# Patient Record
Sex: FEMALE | URBAN - METROPOLITAN AREA
[De-identification: names, ages, dates, MRNs, and addresses within clinical notes are randomized per-mention and may not be internally consistent; named-entity substitution may affect disease eponyms.]

---

## 2023-11-02 ENCOUNTER — TRANSFERRED RECORDS (OUTPATIENT)
Dept: HEALTH INFORMATION MANAGEMENT | Facility: CLINIC | Age: 73
End: 2023-11-02

## 2024-01-05 ENCOUNTER — MEDICAL CORRESPONDENCE (OUTPATIENT)
Dept: HEALTH INFORMATION MANAGEMENT | Facility: CLINIC | Age: 74
End: 2024-01-05
Payer: COMMERCIAL

## 2024-01-05 ENCOUNTER — TRANSFERRED RECORDS (OUTPATIENT)
Dept: HEALTH INFORMATION MANAGEMENT | Facility: CLINIC | Age: 74
End: 2024-01-05
Payer: COMMERCIAL

## 2024-01-10 ENCOUNTER — TRANSCRIBE ORDERS (OUTPATIENT)
Dept: OTHER | Age: 74
End: 2024-01-10

## 2024-01-10 DIAGNOSIS — M25.562 BILATERAL KNEE PAIN: ICD-10-CM

## 2024-01-10 DIAGNOSIS — R68.89 FORGETFULNESS: Primary | ICD-10-CM

## 2024-01-10 DIAGNOSIS — M25.561 BILATERAL KNEE PAIN: ICD-10-CM

## 2025-04-03 ENCOUNTER — TRANSFERRED RECORDS (OUTPATIENT)
Dept: HEALTH INFORMATION MANAGEMENT | Facility: CLINIC | Age: 75
End: 2025-04-03
Payer: COMMERCIAL

## 2025-04-03 ENCOUNTER — HOSPITAL ENCOUNTER (OUTPATIENT)
Facility: CLINIC | Age: 75
End: 2025-04-03
Attending: STUDENT IN AN ORGANIZED HEALTH CARE EDUCATION/TRAINING PROGRAM | Admitting: STUDENT IN AN ORGANIZED HEALTH CARE EDUCATION/TRAINING PROGRAM
Payer: COMMERCIAL

## 2025-04-15 ENCOUNTER — TRANSFERRED RECORDS (OUTPATIENT)
Dept: HEALTH INFORMATION MANAGEMENT | Facility: CLINIC | Age: 75
End: 2025-04-15
Payer: COMMERCIAL

## 2025-04-17 VITALS — WEIGHT: 182.8 LBS | BODY MASS INDEX: 33.64 KG/M2 | HEIGHT: 62 IN

## 2025-04-17 RX ORDER — LEVOTHYROXINE SODIUM 50 UG/1
50 TABLET ORAL
COMMUNITY
End: 2025-04-23 | Stop reason: HOSPADM

## 2025-04-17 RX ORDER — LOSARTAN POTASSIUM 100 MG/1
100 TABLET ORAL DAILY
COMMUNITY
End: 2025-04-23 | Stop reason: HOSPADM

## 2025-04-17 RX ORDER — ASPIRIN 81 MG/1
81 TABLET ORAL DAILY
COMMUNITY
End: 2025-04-17

## 2025-04-17 RX ORDER — HYDROCHLOROTHIAZIDE 25 MG/1
25 TABLET ORAL DAILY
COMMUNITY
End: 2025-04-23 | Stop reason: HOSPADM

## 2025-04-17 NOTE — PROGRESS NOTES
Planning to discharge home on POD 1 around lunchtime with her  and daughter staying with her to help after surgery. Her sons and daughter-in-laws will also be helping after surgery.       04/17/25 1442   Discharge Planning   Patient/Family Anticipates Transition to home with family  (Patient's daughter will call to arrange outpatient PT at [facility])   Concerns to be Addressed all concerns addressed in this encounter   Living Arrangements   People in Home spouse   Type of Residence Private Residence   Is your private residence a single family home or apartment? Single family home   Number of Stairs, Within Home, Primary none  (1 exterior step)   Once home, are you able to live on one level? Yes   Which level? Main Level   Bathroom Shower/Tub Tub/Shower unit   Equipment Currently Used at Home shower chair  (Has a cane and walker at home)   Support System   Support Systems Spouse/Significant Other;Children  (, Bacilio, daughter, Carmella, and sons, Montez, Charles, and Blu)   Do you have someone available to stay with you one or two nights once you are home? Yes   Education   Patient attended total joint pre-op class/received pre-op teaching  email/phone call

## 2025-04-22 ENCOUNTER — TRANSFERRED RECORDS (OUTPATIENT)
Dept: MULTI SPECIALTY CLINIC | Facility: CLINIC | Age: 75
End: 2025-04-22
Payer: COMMERCIAL

## 2025-04-22 LAB
CREATININE (EXTERNAL): 0.9 MG/DL (ref 0.7–1.4)
GLUCOSE (EXTERNAL): 101 MG/DL (ref 60–99)
POTASSIUM (EXTERNAL): 3.7 MMOL/L (ref 3.5–5.1)

## 2025-04-29 ENCOUNTER — TRANSFERRED RECORDS (OUTPATIENT)
Dept: SURGERY | Facility: CLINIC | Age: 75
End: 2025-04-29
Payer: COMMERCIAL